# Patient Record
Sex: MALE | Race: WHITE | NOT HISPANIC OR LATINO | Employment: FULL TIME | URBAN - METROPOLITAN AREA
[De-identification: names, ages, dates, MRNs, and addresses within clinical notes are randomized per-mention and may not be internally consistent; named-entity substitution may affect disease eponyms.]

---

## 2022-08-05 ENCOUNTER — OFFICE VISIT (OUTPATIENT)
Dept: INTERNAL MEDICINE | Facility: CLINIC | Age: 27
End: 2022-08-05

## 2022-08-05 VITALS
SYSTOLIC BLOOD PRESSURE: 128 MMHG | BODY MASS INDEX: 21.22 KG/M2 | DIASTOLIC BLOOD PRESSURE: 82 MMHG | HEART RATE: 104 BPM | TEMPERATURE: 99.9 F | RESPIRATION RATE: 20 BRPM | HEIGHT: 71 IN | WEIGHT: 151.6 LBS | OXYGEN SATURATION: 98 %

## 2022-08-05 DIAGNOSIS — Z13.220 SCREENING FOR LIPID DISORDERS: ICD-10-CM

## 2022-08-05 DIAGNOSIS — Z01.89 ROUTINE LAB DRAW: ICD-10-CM

## 2022-08-05 DIAGNOSIS — Z11.59 NEED FOR HEPATITIS C SCREENING TEST: ICD-10-CM

## 2022-08-05 DIAGNOSIS — U07.1 COVID-19: Primary | ICD-10-CM

## 2022-08-05 LAB
EXPIRATION DATE: ABNORMAL
FLUAV AG UPPER RESP QL IA.RAPID: NOT DETECTED
FLUBV AG UPPER RESP QL IA.RAPID: NOT DETECTED
INTERNAL CONTROL: ABNORMAL
Lab: ABNORMAL
SARS-COV-2 AG UPPER RESP QL IA.RAPID: DETECTED

## 2022-08-05 PROCEDURE — 87428 SARSCOV & INF VIR A&B AG IA: CPT

## 2022-08-05 PROCEDURE — 99203 OFFICE O/P NEW LOW 30 MIN: CPT

## 2022-08-05 RX ORDER — METHYLPREDNISOLONE 4 MG/1
TABLET ORAL
Qty: 21 EACH | Refills: 0 | Status: SHIPPED | OUTPATIENT
Start: 2022-08-05 | End: 2022-08-10

## 2022-08-05 RX ORDER — BROMPHENIRAMINE MALEATE, PSEUDOEPHEDRINE HYDROCHLORIDE, AND DEXTROMETHORPHAN HYDROBROMIDE 2; 30; 10 MG/5ML; MG/5ML; MG/5ML
5 SYRUP ORAL 4 TIMES DAILY PRN
Qty: 120 ML | Refills: 0 | Status: SHIPPED | OUTPATIENT
Start: 2022-08-05

## 2022-08-05 RX ORDER — ALBUTEROL SULFATE 90 UG/1
2 AEROSOL, METERED RESPIRATORY (INHALATION) EVERY 4 HOURS PRN
Qty: 18 G | Refills: 0 | Status: SHIPPED | OUTPATIENT
Start: 2022-08-05

## 2022-08-05 NOTE — PROGRESS NOTES
"Chief Complaint  Establish Care (No previous PCP) and Covid Test Only (Patient tested positive for Covid. Symptoms first started this morning. Chest pains/tightness, shallow breathing, fatigue, runny nose.)    BERNADETTE Crowley presents to [facility] to establish care and recently tested positive for COVID-19. Symptoms started today.     Past medical problems include-None    Alcohol use-occasional, once/week 1 pint.    Tobacco use- 1 pack/week for 15 years.  , one partner.   COVID-19 vaccine-Declines  [facility].  History of Present Illness  History reviewed. No pertinent past medical history.   History reviewed. No pertinent family history.   Past Surgical History:   Procedure Laterality Date   • HERNIA REPAIR          Current Outpatient Medications:   •  albuterol sulfate  (90 Base) MCG/ACT inhaler, Inhale 2 puffs Every 4 (Four) Hours As Needed for Wheezing or Shortness of Air., Disp: 18 g, Rfl: 0  •  brompheniramine-pseudoephedrine-DM 30-2-10 MG/5ML syrup, Take 5 mL by mouth 4 (Four) Times a Day As Needed for Congestion or Cough., Disp: 120 mL, Rfl: 0  •  methylPREDNISolone (MEDROL) 4 MG dose pack, Take as directed on package instructions., Disp: 21 each, Rfl: 0    OBJECTIVE  Vital Signs:   /82 (BP Location: Right arm, Patient Position: Sitting, Cuff Size: Adult)   Pulse 104   Temp 99.9 °F (37.7 °C) (Temporal)   Resp 20   Ht 180.3 cm (71\")   Wt 68.8 kg (151 lb 9.6 oz)   SpO2 98%   BMI 21.14 kg/m²    Estimated body mass index is 21.14 kg/m² as calculated from the following:    Height as of this encounter: 180.3 cm (71\").    Weight as of this encounter: 68.8 kg (151 lb 9.6 oz).     Wt Readings from Last 3 Encounters:   08/05/22 68.8 kg (151 lb 9.6 oz)     BP Readings from Last 3 Encounters:   08/05/22 128/82       Physical Exam     Result Review        No labs or images in the past 120 days found..     The above data has been reviewed by Wendy " DEZNEL Aleman 08/05/2022 11:49 EDT.          Patient Care Team:  Wendy Aleman APRN as PCP - General (Internal Medicine)    BMI is within normal parameters. No other follow-up for BMI required.       ASSESSMENT & PLAN    Diagnoses and all orders for this visit:    1. COVID-19 (Primary)  Assessment & Plan:  Patient complains of chest tightness, shallow breathing, fever, sore throat, myalgias, SOA, cough, fatigue and runny nose.  Symptoms started this morning. Denies nausea, vomiting, diarrhea. He did test positive for COVID-19.  He did take Tylenol for fever.  Plan: Chest x-ray, Medrol Dosepak, Bromfed for cough, albuterol for shortness of breath chest tightness.    Patient advised to quit smoking.  Patient was instructed to go to the emergency department with any sitting symptoms including but not limited to chest pain, worsening shortness of breath, wheezing, fever.  Encouraged him to start vitamin C, vitamin D and zinc.  Increase fluids..  He is instructed to quarantine for 7 days.  He was instructed to wear a mask after until symptoms have completely resolved.         Orders:  -     XR Chest PA & Lateral; Future  -     albuterol sulfate  (90 Base) MCG/ACT inhaler; Inhale 2 puffs Every 4 (Four) Hours As Needed for Wheezing or Shortness of Air.  Dispense: 18 g; Refill: 0  -     POCT SARS-CoV-2 Antigen SAL + Flu    2. Screening for lipid disorders  -     Lipid Panel; Future    3. Routine lab draw  -     TSH+Free T4; Future  -     CBC & Differential; Future  -     Comprehensive Metabolic Panel; Future  -     Urinalysis With Microscopic - Urine, Clean Catch; Future    4. Need for hepatitis C screening test  -     Hepatitis C antibody; Future    Other orders  -     brompheniramine-pseudoephedrine-DM 30-2-10 MG/5ML syrup; Take 5 mL by mouth 4 (Four) Times a Day As Needed for Congestion or Cough.  Dispense: 120 mL; Refill: 0  -     methylPREDNISolone (MEDROL) 4 MG dose pack; Take as directed on package  instructions.  Dispense: 21 each; Refill: 0       Tobacco Use: High Risk   • Smoking Tobacco Use: Current Every Day Smoker   • Smokeless Tobacco Use: Current User       Follow Up     Return in about 2 weeks (around 8/19/2022) for Annual physical.      Patient was given instructions and counseling regarding his condition or for health maintenance advice. Please see specific information pulled into the AVS if appropriate.   I have reviewed information obtained and documented by others and I have confirmed the accuracy of this documented note.    DENZEL Jacob

## 2022-08-05 NOTE — ASSESSMENT & PLAN NOTE
Patient complains of chest tightness, shallow breathing, fever, sore throat, myalgias, SOA, cough, fatigue and runny nose.  Symptoms started this morning. Denies nausea, vomiting, diarrhea. He did test positive for COVID-19.  He did take Tylenol for fever.  Plan: Chest x-ray, Medrol Dosepak, Bromfed for cough, albuterol for shortness of breath chest tightness.    Patient advised to quit smoking.  Patient was instructed to go to the emergency department with any sitting symptoms including but not limited to chest pain, worsening shortness of breath, wheezing, fever.  Encouraged him to start vitamin C, vitamin D and zinc.  Increase fluids..  He is instructed to quarantine for 7 days.  He was instructed to wear a mask after until symptoms have completely resolved.